# Patient Record
Sex: FEMALE | URBAN - NONMETROPOLITAN AREA
[De-identification: names, ages, dates, MRNs, and addresses within clinical notes are randomized per-mention and may not be internally consistent; named-entity substitution may affect disease eponyms.]

---

## 2017-01-05 ENCOUNTER — APPOINTMENT (RX ONLY)
Dept: URBAN - NONMETROPOLITAN AREA CLINIC 1 | Facility: CLINIC | Age: 32
Setting detail: DERMATOLOGY
End: 2017-01-05

## 2017-01-05 DIAGNOSIS — L30.1 DYSHIDROSIS [POMPHOLYX]: ICD-10-CM

## 2017-01-05 PROCEDURE — ? OTHER

## 2017-01-05 PROCEDURE — ? TREATMENT REGIMEN

## 2017-01-05 PROCEDURE — ? PRESCRIPTION

## 2017-01-05 PROCEDURE — 99202 OFFICE O/P NEW SF 15 MIN: CPT

## 2017-01-05 PROCEDURE — ? COUNSELING

## 2017-01-05 RX ORDER — CLOBETASOL PROPIONATE 0.5 MG/G
CREAM TOPICAL BID
Qty: 1 | Refills: 1 | Status: ERX | COMMUNITY
Start: 2017-01-05

## 2017-01-05 RX ADMIN — CLOBETASOL PROPIONATE: 0.5 CREAM TOPICAL at 13:32

## 2017-01-05 ASSESSMENT — LOCATION ZONE DERM: LOCATION ZONE: HAND

## 2017-01-05 ASSESSMENT — LOCATION DETAILED DESCRIPTION DERM: LOCATION DETAILED: RIGHT ULNAR PALM

## 2017-01-05 ASSESSMENT — SEVERITY ASSESSMENT: SEVERITY: MILD

## 2017-01-05 ASSESSMENT — LOCATION SIMPLE DESCRIPTION DERM: LOCATION SIMPLE: RIGHT HAND

## 2017-01-05 NOTE — HPI: RASH
How Severe Is Your Rash?: mild
Is This A New Presentation, Or A Follow-Up?: Rash
Additional History: Rash started 8/2/2016.  [place] that was orange yellow berries and then rash started. Pt has tried Triamcinolone bid with no relief. Coconut oil with occlusion qHS

## 2017-01-05 NOTE — PROCEDURE: OTHER
Other (Free Text): Pt does not have any history of allergies or asthma.\\nPt Dad has history of psoriasis
Detail Level: Zone
Note Text (......Xxx Chief Complaint.): This diagnosis correlates with the